# Patient Record
Sex: FEMALE | Race: BLACK OR AFRICAN AMERICAN | NOT HISPANIC OR LATINO | Employment: OTHER | URBAN - METROPOLITAN AREA
[De-identification: names, ages, dates, MRNs, and addresses within clinical notes are randomized per-mention and may not be internally consistent; named-entity substitution may affect disease eponyms.]

---

## 2019-06-22 ENCOUNTER — HOSPITAL ENCOUNTER (EMERGENCY)
Facility: HOSPITAL | Age: 69
Discharge: HOME | End: 2019-06-22
Attending: EMERGENCY MEDICINE
Payer: MEDICARE

## 2019-06-22 ENCOUNTER — APPOINTMENT (EMERGENCY)
Dept: RADIOLOGY | Facility: HOSPITAL | Age: 69
End: 2019-06-22
Payer: MEDICARE

## 2019-06-22 VITALS
HEART RATE: 63 BPM | DIASTOLIC BLOOD PRESSURE: 60 MMHG | BODY MASS INDEX: 24.41 KG/M2 | SYSTOLIC BLOOD PRESSURE: 130 MMHG | RESPIRATION RATE: 16 BRPM | TEMPERATURE: 98.2 F | HEIGHT: 64 IN | OXYGEN SATURATION: 96 % | WEIGHT: 143 LBS

## 2019-06-22 DIAGNOSIS — S93.402A SPRAIN OF LEFT ANKLE, INITIAL ENCOUNTER: Primary | ICD-10-CM

## 2019-06-22 DIAGNOSIS — M25.572 ACUTE LEFT ANKLE PAIN: ICD-10-CM

## 2019-06-22 PROCEDURE — 73610 X-RAY EXAM OF ANKLE: CPT | Mod: LT

## 2019-06-22 PROCEDURE — 99283 EMERGENCY DEPT VISIT LOW MDM: CPT

## 2019-06-22 RX ORDER — RALOXIFENE HYDROCHLORIDE 60 MG/1
TABLET, FILM COATED ORAL
COMMUNITY
Start: 2019-06-20 | End: 2021-12-08

## 2019-06-22 ASSESSMENT — ENCOUNTER SYMPTOMS
VOMITING: 0
DIFFICULTY URINATING: 0
NAUSEA: 0
NECK PAIN: 0
HEMATOLOGIC/LYMPHATIC NEGATIVE: 1
PSYCHIATRIC NEGATIVE: 1
DIARRHEA: 0
DIAPHORESIS: 0
BACK PAIN: 0
SHORTNESS OF BREATH: 0
FEVER: 0
HEADACHES: 0
CHEST TIGHTNESS: 0
ABDOMINAL PAIN: 0

## 2019-06-22 NOTE — DISCHARGE INSTRUCTIONS
You were seen here for an ankle injury which occurred around 11 am today. You had a 3-view X-ray of your left ankle which was negative for a fracture.  Please follow-up with Dr. Junior Sanchez for orthopedic.    Please return to the ER for worsening symptoms.

## 2019-06-22 NOTE — ED PROVIDER NOTES
"HPI    Chief Complaint   Patient presents with   • Ankle Pain        HPI   68 year old female presenting with ankle injury. Reports she turned abruptly and twisted her left ankle. Denies dizziness, lightheadedness or nausea. States her pain is presently at a 7.       History reviewed. No pertinent past medical history.      Past Surgical History:   Procedure Laterality Date   • HYSTERECTOMY         History reviewed. No pertinent family history.    Social History   Substance Use Topics   • Smoking status: Never Smoker   • Smokeless tobacco: Never Used   • Alcohol use No       Systems Reviewed from Nursing Triage:              Review of Systems   Constitutional: Negative for diaphoresis and fever.   HENT: Negative.    Respiratory: Negative for chest tightness and shortness of breath.    Cardiovascular: Negative for chest pain.   Gastrointestinal: Negative for abdominal pain, diarrhea, nausea and vomiting.   Genitourinary: Negative for difficulty urinating.   Musculoskeletal: Negative for back pain and neck pain.        Left ankle pain   Skin: Negative for pallor.   Neurological: Negative for headaches.   Hematological: Negative.    Psychiatric/Behavioral: Negative.    All other systems reviewed and are negative.           ED Triage Vitals [06/22/19 1506]   Temp Heart Rate Resp BP SpO2   36.8 °C (98.2 °F) 63 16 130/60 96 %      Temp Source Heart Rate Source Patient Position BP Location FiO2 (%) (Set)   Temporal -- -- -- --       Vitals:    06/22/19 1506   BP: 130/60   Pulse: 63   Resp: 16   Temp: 36.8 °C (98.2 °F)   TempSrc: Temporal   SpO2: 96%   Weight: 64.9 kg (143 lb)   Height: 1.626 m (5' 4\")                         Physical Exam   Constitutional: She is oriented to person, place, and time. She appears well-developed and well-nourished. No distress.   HENT:   Head: Normocephalic and atraumatic.   Nose: Nose normal.   Eyes: EOM are normal.   Neck: Normal range of motion. Neck supple.   Cardiovascular: Normal rate " and regular rhythm.    Pulmonary/Chest: Effort normal and breath sounds normal. No respiratory distress.   Abdominal: Soft. There is no tenderness.   Musculoskeletal: Normal range of motion. She exhibits tenderness (Left ankle). She exhibits no edema.   Left lateral outer aspect   Lymphadenopathy:     She has no cervical adenopathy.   Neurological: She is alert and oriented to person, place, and time.   Skin: Skin is warm and dry.   Psychiatric: She has a normal mood and affect.   Nursing note and vitals reviewed.           X-RAY ANKLE LEFT 3+ VIEWS    (Results Pending)       Labs Reviewed - No data to display    X-RAY ANKLE LEFT 3+ VIEWS    (Results Pending)         Procedures    Final diagnoses:   None       ED Course & MDM     ED Course as of Jun 22 2220   Sat Jun 22, 2019   1550 Impression: twisted left ankle, left ankle pain. Left lateral outer aspect.  Plan: X-ray 3 views of left ankle  [MM]   1618 X-ray left ankle: COMMENT: Four views of the left ankle demonstrate no fracture, dislocation or  subluxation. Bone density and architecture are normal. There is no joint  effusion. The mortise is intact. The surrounding soft tissues are normal. No  radiopaque foreign body is visible.     --  IMPRESSION: No acute osseous abnormality  [MM]      ED Course User Index  [MM] Gisselle Trinidad CRNP         Clinical Impressions as of Jun 22 2220   Sprain of left ankle, initial encounter   Acute left ankle pain           [facility]    3:51 PM    Impression: Left ankle injury    Plan: 3-view x-ray  YUDY King  6/22/2019       Gisselle Trinidad CRNP  06/22/19 2221

## 2019-06-23 NOTE — ED ATTESTATION NOTE
6/22/201911:56 PM  I have personally seen and examined the patient.  I reviewed and agree with the PA/NP/Resident's assessment and plan of care.    My examination, assessment, and plan of care of Zaina Ortiz is as follows:  The patient presents with an inversion injury to the left ankle pain over the lateral malleolus.  She denies any numbness tingling or weakness in the extremity.      Exam: Patient's left ankle reveals mild tenderness over the anterior talofibular ligament.  There is minimal swelling.  Patellar tilt patellar drawer exam were negative.  Achilles tendon is intact.  Capillary refill and pulses are intact.  Range of motion is well-preserved.      Impression/Plan: Patient had an inversion injury and sprained her ankle.  X-ray does not show any acute fracture.  We will place an Aircast and discharged to follow-up with orthopedics.     I was physically present for the key/critical portions of the following procedures: None     Campos Carter, DO  06/22/19 5911

## 2021-02-15 ENCOUNTER — IMMUNIZATIONS (OUTPATIENT)
Dept: FAMILY MEDICINE CLINIC | Facility: HOSPITAL | Age: 71
End: 2021-02-15

## 2021-02-15 DIAGNOSIS — Z23 ENCOUNTER FOR IMMUNIZATION: Primary | ICD-10-CM

## 2021-02-15 PROCEDURE — 0001A SARS-COV-2 / COVID-19 MRNA VACCINE (PFIZER-BIONTECH) 30 MCG: CPT

## 2021-02-15 PROCEDURE — 91300 SARS-COV-2 / COVID-19 MRNA VACCINE (PFIZER-BIONTECH) 30 MCG: CPT

## 2021-03-10 ENCOUNTER — IMMUNIZATIONS (OUTPATIENT)
Dept: FAMILY MEDICINE CLINIC | Facility: HOSPITAL | Age: 71
End: 2021-03-10

## 2021-03-10 DIAGNOSIS — Z23 ENCOUNTER FOR IMMUNIZATION: Primary | ICD-10-CM

## 2021-03-10 PROCEDURE — 91300 SARS-COV-2 / COVID-19 MRNA VACCINE (PFIZER-BIONTECH) 30 MCG: CPT

## 2021-03-10 PROCEDURE — 0002A SARS-COV-2 / COVID-19 MRNA VACCINE (PFIZER-BIONTECH) 30 MCG: CPT

## 2021-12-08 ENCOUNTER — HOSPITAL ENCOUNTER (EMERGENCY)
Facility: HOSPITAL | Age: 71
Discharge: HOME | End: 2021-12-08
Attending: EMERGENCY MEDICINE
Payer: COMMERCIAL

## 2021-12-08 ENCOUNTER — APPOINTMENT (EMERGENCY)
Dept: RADIOLOGY | Facility: HOSPITAL | Age: 71
End: 2021-12-08
Payer: COMMERCIAL

## 2021-12-08 VITALS
OXYGEN SATURATION: 96 % | DIASTOLIC BLOOD PRESSURE: 74 MMHG | TEMPERATURE: 98 F | SYSTOLIC BLOOD PRESSURE: 176 MMHG | HEART RATE: 65 BPM | RESPIRATION RATE: 18 BRPM

## 2021-12-08 DIAGNOSIS — S09.90XA HEAD INJURY, INITIAL ENCOUNTER: Primary | ICD-10-CM

## 2021-12-08 PROCEDURE — 99284 EMERGENCY DEPT VISIT MOD MDM: CPT | Mod: 25

## 2021-12-08 PROCEDURE — 70450 CT HEAD/BRAIN W/O DYE: CPT | Mod: MG

## 2021-12-08 PROCEDURE — G1004 CDSM NDSC: HCPCS

## 2021-12-08 ASSESSMENT — ENCOUNTER SYMPTOMS
EYE PAIN: 0
DIFFICULTY URINATING: 0
VOMITING: 0
SHORTNESS OF BREATH: 0
BACK PAIN: 0
SORE THROAT: 0
DIZZINESS: 0
FEVER: 0
COLOR CHANGE: 0
ARTHRALGIAS: 0
MYALGIAS: 0
FATIGUE: 0
FLANK PAIN: 0
ABDOMINAL PAIN: 0
SPEECH DIFFICULTY: 0
HEADACHES: 1
NECK PAIN: 0
COUGH: 0
CONSTIPATION: 0
CONFUSION: 0
NUMBNESS: 0
DIARRHEA: 0
NAUSEA: 0
WEAKNESS: 0

## 2021-12-09 NOTE — DISCHARGE INSTRUCTIONS
As discussed please call your primary care doctor today to inform them of your ER visit and arrange a follow-up appointment within the next 2-3 days.  If you do not have one we have provided you with one. You do not have to see this provider in particular, just call the office and ask for the earliest available appointment with any provider at any location.  Please return immediately for any worsening, new or concerning symptoms such as new/worsening pain, fevers, confusion, lethargy, vomiting, etc    Please take acetaminophen (Tylenol) as per package instructions.  Please do not exceed 3 g in 24 hours    Please use ice.  Please do not apply directly to the skin.  Please do not leave in place while asleep.  Please place for 10-15 minutes and then remove it for 10-15 minutes

## 2021-12-09 NOTE — ED PROVIDER NOTES
History provided by patient :    Patient with any significant past medical history.  Patient states 2 hours ago she was getting something out of the freezer when a piece of frozen deer meat fell of the freezer striking her on the head.  Patient denies any loss of consciousness.  Patient with a hematoma to the back of the head.  No other injury sustained.  Patient states having headache however that occurred earlier in the night prior to the trauma.  She states presently 7 out of 10.  Attempted Advil last dose at 530 for symptoms without improvement.  Patient denies blood thinners.  Patient denies pain elsewhere, dizziness, changes to hearing/been/speech, numbness, weakness, chest pain, shortness of breath, nausea, vomiting or any other symptoms she can report    HPI    History reviewed. No pertinent past medical history.    Past Surgical History:   Procedure Laterality Date   • HYSTERECTOMY         No Known Allergies    Social History     Tobacco Use   Smoking Status Never Smoker   Smokeless Tobacco Never Used       Social History     Substance and Sexual Activity   Alcohol Use No       Social History     Substance and Sexual Activity   Drug Use No       No LMP recorded.    Review of Systems   Constitutional: Negative for fatigue and fever.   HENT: Negative for congestion, ear pain, hearing loss and sore throat.    Eyes: Negative for pain and visual disturbance.   Respiratory: Negative for cough and shortness of breath.    Cardiovascular: Negative for chest pain.   Gastrointestinal: Negative for abdominal pain, constipation, diarrhea, nausea and vomiting.   Genitourinary: Negative for difficulty urinating and flank pain.   Musculoskeletal: Negative for arthralgias, back pain, myalgias and neck pain.   Skin: Negative for color change and rash.   Neurological: Positive for headaches. Negative for dizziness, speech difficulty, weakness and numbness.   Psychiatric/Behavioral: Negative for confusion.       Vitals:     12/08/21 2033   BP: (!) 176/74   Pulse: 65   Resp: 18   Temp: 36.7 °C (98 °F)   TempSrc: Temporal   SpO2: 96%       Physical Exam  Vitals and nursing note reviewed.   Constitutional:       General: She is not in acute distress.     Appearance: Normal appearance. She is not ill-appearing or toxic-appearing.   HENT:      Head: Normocephalic.      Comments: Firm posterior scalp hematoma appreciated.  No raccoon eyes or palmer sign     Mouth/Throat:      Comments: Airway intact  Eyes:      Extraocular Movements: Extraocular movements intact.      Conjunctiva/sclera: Conjunctivae normal.      Pupils: Pupils are equal, round, and reactive to light.   Cardiovascular:      Rate and Rhythm: Normal rate and regular rhythm.   Pulmonary:      Effort: Pulmonary effort is normal. No respiratory distress.      Breath sounds: Normal breath sounds.   Abdominal:      General: There is no distension.      Palpations: Abdomen is soft. There is no mass.      Tenderness: There is no abdominal tenderness. There is no guarding.   Musculoskeletal:         General: Normal range of motion.      Cervical back: Normal range of motion. No rigidity.      Comments: No vertebral tenderness. Ambulates with safe and steady gait.   Skin:     General: Skin is warm and dry.   Neurological:      Mental Status: She is alert and oriented to person, place, and time.      Cranial Nerves: No cranial nerve deficit.      Sensory: No sensory deficit.   Psychiatric:         Behavior: Behavior normal.      Comments: Speech normal         Procedures    ED Course as of 12/08/21 2225   Wed Dec 08, 2021   2058 Impression    2 hours ago a piece of frozen deer meat fell of the freezer onto her head.  No loss of consciousness.  No other injury sustained.  With a hematoma to the posterior scalp.  Patient with a headache earlier the night prior to the trauma states that 7 out of 10.  No focal deficits.  No thinners well-appearing and nontoxic no vertebral tenderness.   Lungs clear abdomen softPlan-CT head and C-spine [MARKUS]   2223 Ct   --  IMPRESSION:     No acute findings seen in brain and cervical spine. No fracture is identified [MARKUS]   2223 We will discharge patient to follow-up with PCP    Patient discussed with Dr vela, agreeable to workup/plan    To patient's bedside. Patient exam unchanged. Patient sitting quietly and comfortably.  Discussed results and plan with patient.  Patient verbalized understanding and agreeable without any questions or concerns.      Conversation had and education provided on signs and symptoms that would warrant a return visit to the emergency department along need for follow-up.  Patient verbalized understanding and agreeable.  All questions answered    Discharge instructions reviewed with patient.  Verbalized understanding.  All questions answered.  Verbal signature obtained.  Patient exited department in no distress with safe and steady gait   [MARKUS]      ED Course User Index  [MARKUS] Sravani Mayes CRNP         Clinical Impressions as of 12/08/21 2225   Head injury, initial encounter       Final diagnoses:   None       Labs Reviewed - No data to display    CT HEAD WITHOUT IV CONTRAST    (Results Pending)   CT CERVICAL SPINE WITHOUT IV CONTRAST    (Results Pending)              Sravani Mayes CRNP  12/08/21 2226

## 2021-12-09 NOTE — ED ATTESTATION NOTE
I reviewed and agree with physician assistant / nurse practitioner’s assessment and plan of care.  We discussed the treatment plan for the patient.  I was immediately available for any questions regarding the care of the patient.     Zaina Dunbar MD  12/08/21 7109

## 2023-10-16 ENCOUNTER — TRANSCRIBE ORDERS (OUTPATIENT)
Dept: SCHEDULING | Age: 73
End: 2023-10-16

## 2023-10-16 DIAGNOSIS — R73.01 IMPAIRED FASTING GLUCOSE: Primary | ICD-10-CM

## 2023-10-17 ENCOUNTER — OFFICE VISIT (OUTPATIENT)
Dept: NUTRITION | Facility: HOSPITAL | Age: 73
End: 2023-10-17
Attending: FAMILY MEDICINE
Payer: COMMERCIAL

## 2023-10-17 VITALS — BODY MASS INDEX: 25.4 KG/M2 | WEIGHT: 148 LBS

## 2023-10-17 DIAGNOSIS — R73.01 IMPAIRED FASTING GLUCOSE: Primary | ICD-10-CM

## 2023-10-17 PROCEDURE — 97802 MEDICAL NUTRITION INDIV IN: CPT | Performed by: DIETITIAN, REGISTERED

## 2023-10-17 NOTE — PROGRESS NOTES
Medical Nutrition Therapy Care Plan - Initial      Zaina is interested in discussing:preventing diabetes    Assessment:   Vitals:    10/17/23 1605   Weight: 67.1 kg (148 lb)  Comment: stated      Body mass index is 25.4 kg/m².    BMI Readings from Last 1 Encounters:   10/17/23 25.40 kg/m²       Wt Readings from Last 3 Encounters:   10/17/23 67.1 kg (148 lb)   06/22/19 64.9 kg (143 lb)       GI Problems: n/a  Food allergies/intolerances: doesn't like yogurt  Nutrition-related labs: reports prediabetic labs, not available to me to view  Alcohol: glass of wine 4-5 x/week  Exercise: 1 hour 3x/week (treadmill, weights, charles)    Typical Day: 3 meals/day;   Breakfast:  Egg on corned beef patties; or pan-fried potatoes (maybe with egg), 1.5 slice whitney; or waffles (2x/week); or homemade muffins;or nut bar with fruit  Lunch:  Avocado toast, something light  Dinner:  Balanced dinner  Snack(s): wine and popcorn with small amount of butter/salt or 3 scoops ice cream    Fluid intake: coffee with sweeta, water, la croix, diet snapple  Dining out/pick-up/take-out: not regularly    Comments: Zaina likes a snack in the evening with tv as a wind-down routine. She likes a variety of healthy foods, stays active.     Nutrition Diagnosis:    P - altered nutrition-related lab values related to  E - endocrine function as evidenced by   S- report of elevated fasting glucose    Intervention/Plan: discussed impact of physical activity on BG - Zaina is open to walking more frequently. Reviewed balanced plate. Discussed some options around evening snack/wine. Zaina would like to keep this the same as she has already made changes to this routine    Monitor and Evaluate  _x__ Monitor nutritional status             _x__ PO intake, diet tolerance  _x__ Labs:   ___ Weight  ___ Tube feeding/TPN  ___ Need for additional diet education  ___ Other     Recommendation/Care Plan  ___ Continue current nutrition Plan:   _x__ Change diet to: as  discussed  ___ Add snacks:   ___ Add supplements    Handout Given: meal and snack ideas      Problem list, medication list & allergies reviewed only within scope of medical nutrition therapy.     60 minutes spent with patient in appointment.    DEANDRE Olivo

## 2024-01-03 ENCOUNTER — TELEPHONE (OUTPATIENT)
Dept: NUTRITION | Facility: HOSPITAL | Age: 74
End: 2024-01-03
Payer: COMMERCIAL

## 2024-11-20 ENCOUNTER — TRANSCRIBE ORDERS (OUTPATIENT)
Dept: RADIOLOGY | Facility: HOSPITAL | Age: 74
End: 2024-11-20

## 2024-11-20 ENCOUNTER — HOSPITAL ENCOUNTER (OUTPATIENT)
Dept: RADIOLOGY | Facility: HOSPITAL | Age: 74
Discharge: HOME | End: 2024-11-20
Attending: FAMILY MEDICINE
Payer: COMMERCIAL

## 2024-11-20 DIAGNOSIS — Z12.31 ENCOUNTER FOR SCREENING MAMMOGRAM FOR MALIGNANT NEOPLASM OF BREAST: Primary | ICD-10-CM

## 2024-11-20 DIAGNOSIS — Z12.31 ENCOUNTER FOR SCREENING MAMMOGRAM FOR MALIGNANT NEOPLASM OF BREAST: ICD-10-CM

## 2024-11-20 PROCEDURE — 77063 BREAST TOMOSYNTHESIS BI: CPT
